# Patient Record
(demographics unavailable — no encounter records)

---

## 2024-11-12 NOTE — ASSESSMENT
[FreeTextEntry1] : Previously under the care of Dr Moura Prior diagnosis of SLE/Sjogren's, being monitored every 6 months without treatment Chronic alopecia, + dry eyes/mouth, + Raynaud's, hand pain (wrist synovitis) Serology: + SSA >8, Sm 2.5, RNP 3.9; normal C' and DsDNA, normal ESR and CRP no cytopenia, no proteinuria X-rays of hands: OA changes  disease activity markers to be obtained today discussed use of HCQ but can hold off given lack of symptoms advised to be UTD with age appropriate screening, eye and dental check ups    #+ cervical LN on exam last visit, as per patient present for a while, since the COVID vaccine neck US showed enlarged lymph nodes in the neck, likely to be reactive s.p follow up with Dr Goldberg hematology - reactive LNs, recommend monitoring and follow up in 6 months (scheduled for December)   RTO in 4 months.

## 2024-11-12 NOTE — HISTORY OF PRESENT ILLNESS
[de-identified] :  At today's visit. [FreeTextEntry1] :  feels well overall, dry eyes improved with using ointments no rashes, no oral or nasal ulcers, no swollen glands no joint pain or swelling no SOB or chest pain No

## 2024-12-09 NOTE — ASSESSMENT
[FreeTextEntry1] : 65 y/o w/ hx of Sjogren's syndrome and HTN who presents to the office for cervical LAD on US neck. The patient was asked to see our office for cervical LAD seen on US. US of her neck showed findings that were consistent with prominent bilateral superficial right and left cervical level 5 lymph nodes which are likely to represent reactive LNs. Specific findings included 1.0 x 0.4 cm prominent superficial benign appearing right cervical level 5 lymph node and similar appearing 1.1 x 0.6 cm left cervical level 5 lymph node.   At this point clinically patient is still without B symptoms, with bilaterality of LAD, shotty nature on clinical exam, and relatively stable peripheral blood counts would favor these lymph nodes as most likely reactive with can be actively monitored. They have not progressed or changed since our first evaluation.  Plan: -Counselled patient regarding symptoms to be aware of to return, including any LN changes or B symptoms. Reassurance provided.  -Follow up with rheumatology.  -Patient understands and agrees with plan. All information explained to the best of my ability.  -RTC PRN in the future.  
No

## 2024-12-09 NOTE — PHYSICAL EXAM
[Fully active, able to carry on all pre-disease performance without restriction] : Status 0 - Fully active, able to carry on all pre-disease performance without restriction [Normal] : grossly intact [de-identified] : _ shotty cervical LAD [de-identified] : + shotty cervical LAD,bilaterally at level Vb - unchanged on exam and mobile.

## 2024-12-09 NOTE — HISTORY OF PRESENT ILLNESS
[de-identified] : 62 y/o w/ hx of Sjogren's syndrome and HTN who presents as an a follow up to our office in the setting of having cervical lymphadenopathy. She notes that after her COVID booster ~3 years ago (Dec 2021) noted swelling in the left side of her neck that subsided. She brought up this incident with her rheumatologist and was advised to get an US of her neck which showed findings that were consistent with prominent bilateral superficial right and left cervical level 5 lymph nodes which are likely to represent reactive LNs. Specific findings included 1.0 x 0.4 cm prominent superficial benign-appearing right cervical level 5 lymph node and similar appearing 1.1 x 0.6 cm left cervical level 5 lymph node. She has been feeling well overall. Denies any night sweats, any significant weight loss. Denies any early satiety symptoms. Denies any cough or hemoptysis. No recent travel last travel was in Oct 2023 went to [place] felt fine after the trip. Has 1 pet at home, a dog, a new pet, denies any allergic symptoms to the pet. No trouble swallowing. Denies getting influenza immunization this year. Last vax was July 2023 she received the yellow fever vax prior to going to [place] in summer 2023 since then no vax.   PMHx: Sjogren's Syndrome (dx in 2020); HTN Family History: Father (Prostate cancer); Mother (no significant FHx); no siblings. No significant FHx of lymphoma.  Meds: Amlodipine 5mg daily Social: No tobacco use; social EtOH use; no illicit drug use; lives alone,  passed away in 2020 during pandemic. Retired, worked as a  for [employer] in a shelter.  Psych: No significant hx  PSHx: Cholecystectomy (unclear when but many years ago); B/l Knee cartilage repair surgery    [de-identified] : Patient seen for follow up on 12/09/2024. Patient overall feels well, but she reported that she has a roseanna prominence on the R side (clavicle). No pain symptoms associated with it, she just felt it with her hand and noticed it in the mirror. Her lymph nodes feel the same to her, and she denied any night sweats or weight loss. She actually gained a little weight. She has a good appetite.

## 2025-03-25 NOTE — HISTORY OF PRESENT ILLNESS
[de-identified] :  At today's visit. [FreeTextEntry1] :  feels well overall, dry eyes improved with using ointments no rashes, no oral or nasal ulcers, no swollen glands no joint pain or swelling no SOB or chest pain

## 2025-03-25 NOTE — PHYSICAL EXAM
[General Appearance - Alert] : alert [General Appearance - In No Acute Distress] : in no acute distress [Auscultation Breath Sounds / Voice Sounds] : lungs were clear to auscultation bilaterally [Musculoskeletal - Swelling] : no joint swelling seen [Impaired Insight] : insight and judgment were intact [Heart Sounds] : normal S1 and S2

## 2025-03-25 NOTE — ASSESSMENT
[FreeTextEntry1] : ------------------------------------------------------------------------------------------------------------ Previously under the care of Dr Moura Prior diagnosis of SLE/Sjogren's, being monitored every 6 months without treatment Chronic alopecia, + dry eyes/mouth, + Raynaud's, hand pain (wrist synovitis) Serology: + SSA >8, Sm 2.5, RNP 3.9; normal C' and DsDNA, normal ESR and CRP no cytopenia, no proteinuria X-rays of hands: OA changes  disease activity markers to be obtained today discussed use of HCQ but can hold off given lack of symptoms advised to be UTD with age appropriate screening, eye and dental check ups will check SPEP/UPEP at next visit    #+ cervical LN on exam last visit, as per patient present for a while, since the COVID vaccine neck US showed enlarged lymph nodes in the neck, likely to be reactive s.p follow up with Dr Goldberg hematology - reactive LNs, recommend monitoring and follow up in 6 months , seen in December 2024 note reviewed today, no concern for lymphoproliferative process    RTO in 6 months.

## 2025-03-25 NOTE — DATA REVIEWED
[FreeTextEntry1] : Labs and chart notes reviewed today with patient disease activity markers within normal range no inflammation